# Patient Record
Sex: MALE | Race: WHITE | Employment: FULL TIME | URBAN - METROPOLITAN AREA
[De-identification: names, ages, dates, MRNs, and addresses within clinical notes are randomized per-mention and may not be internally consistent; named-entity substitution may affect disease eponyms.]

---

## 2019-05-20 ENCOUNTER — HOSPITAL ENCOUNTER (OUTPATIENT)
Dept: PHYSICAL THERAPY | Age: 49
Discharge: HOME | End: 2019-05-20
Payer: COMMERCIAL

## 2019-05-20 PROCEDURE — 97110 THERAPEUTIC EXERCISES: CPT

## 2019-05-20 PROCEDURE — 97161 PT EVAL LOW COMPLEX 20 MIN: CPT

## 2019-05-20 NOTE — PROGRESS NOTES
PHYSICAL THERAPY - DAILY TREATMENT NOTE Patient Name: Alicja Tran        Date: 2019 : 1970   yes Patient  Verified Visit #:   1   of   8  Insurance: Payor: /   
 
In time: 587 Out time: 605 Total Treatment Time: 30 Medicare/[facility] Time Tracking (below) Total Timed Codes (min):  na 1:1 Treatment Time:  na  
TREATMENT AREA =  Left shoulder pain [M25.512] SUBJECTIVE Pain Level (on 0 to 10 scale):  0  / 10 Medication Changes/New allergies or changes in medical history, any new surgeries or procedures?    no  If yes, update Summary List  
Subjective Functional Status/Changes:  []  No changes reported SEE POC OBJECTIVE 15 min Evaluation 15 min Therapeutic Exercise:  [x]  See flow sheet Rationale:      increase ROM and increase strength to improve the patients ability to perform functional ADL's Billed With/As: 
 [] TE 
 [] TA 
 [] Neuro 
 [] Self Care Patient Education: [x] Review HEP [] Progressed/Changed HEP based on:  
[] positioning   [] body mechanics   [] transfers   [] heat/ice application   
[] other:   
Other Objective/Functional Measures: SEE POC Post Treatment Pain Level (on 0 to 10) scale:   0  / 10 ASSESSMENT Assessment/Changes in Function:  
 
Evaluation Code Complexity: History LOW Complexity : Zero comorbidities / personal factors that will impact the outcome / POC; Examination HIGH Complexity : 4+ Standardized tests and measures addressing body structure, function, activity limitation and / or participation in recreation ; Presentation MEDIUM Complexity : Evolving with changing characteristics ; Decision Making MEDIUM Complexity : FOTO score of 26-74; Complexity LOW Justification for Eval Code Complexity: 
Patient History : None listed Examination SEE ABOVE EXAM 
Clinical Presentation: evolving pain Clinical Decision Making : TYGC 54 
 
  
[]  See Progress Note/Recertification Patient will continue to benefit from skilled PT services to modify and progress therapeutic interventions, address functional mobility deficits, address ROM deficits, address strength deficits, analyze and address soft tissue restrictions, analyze and cue movement patterns, analyze and modify body mechanics/ergonomics and assess and modify postural abnormalities to attain remaining goals. Progress toward goals / Updated goals: SEE POC PLAN 
[]  Upgrade activities as tolerated yes Continue plan of care  
[]  Discharge due to :   
[x]  Other: Pt will be seen 2 times per week for 8 sessions. Therapist: Elena Rosales PT, DPT Date: 5/20/2019 Time: 9:11 AM  
 
No future appointments.

## 2019-05-20 NOTE — PROGRESS NOTES
[address] PHYSICAL THERAPY 
[address] [place] [address], [address],[facility], [facility] - Phone: [phone]  Fax: [phone] PLAN OF CARE / STATEMENT OF MEDICAL NECESSITY FOR PHYSICAL THERAPY SERVICES Patient Name: Jimmy Edwards : 1970 Medical  
Diagnosis: Left shoulder pain [M25.512] Treatment Diagnosis: Left shoulder pain [M25.512] Onset Date: 2 months ago Referral Source: Erica Mendes MD [facility]): 2019 Prior Hospitalization: See medical history Provider #: [id number] Prior Level of Function: I with ADL's, I with recreational and gym activities Comorbidities: None Listed Medications: Verified on Patient Summary List  
The Plan of Care and following information is based on the information from the initial evaluation.  
================================================================================== Assessment / key information:   Jimmy Edwards is a 44 y.o.  yo male with Dx: Left shoulder pain [M25.512]. Pt reports pain began 2 months ago after working out. Pt reports currently seeing a chiropractor as well as working out with a . Pt denies any red flags, N/t, headaches, feeling of popping/grinding/clicking. Pt reports main limitations with bench press movement, as well as overhead motions. Pt reports pain on the lateral aspect of the shoulder, described as deep throbbing. Pt denies pain with sleeping. Pt rates pain as 8/10 max, 0/10 at best.  Pt reprots having similar symptoms 15 years ago and attending OPPT with good results. Objective: FOTO score = 72 (an established functional score where 100 = no disability). Posture FHRS in sitting and standing. Palpation TTP and moderate tightness noted in the anterior shoulder, BT, posterior shoulder. AROM of the L shoulder WNL in all directions with pain at end range flexion.  IR (measured in cm from C7): R 25, L 29 with pain. Strength grossly intact to 4/5, with RTC strength in 4-/5 in all directions. Special Tests (-) empty can, rodriguez raffy, drop arm, load and shift. Functional limitations at this time include decreased lifting overhead, decreased ability to perform recreational activities such as weight lifting. The patient was instructed in a home exercise program to address the above findings/deficits. The patient would benefit from skilled physical therapy at this time to address the above functional deficits.  
================================================================================== Eval Complexity: History LOW Complexity : Zero comorbidities / personal factors that will impact the outcome / POC;  Examination  HIGH Complexity : 4+ Standardized tests and measures addressing body structure, function, activity limitation and / or participation in recreation ; Presentation MEDIUM Complexity : Evolving with changing characteristics ; Decision Making MEDIUM Complexity : FOTO score of 26-74; Overall Complexity LOW Problem List: pain affecting function, decrease strength, decrease ADL/ functional abilitiies and decrease activity tolerance Treatment Plan may include any combination of the following: Therapeutic exercise, Therapeutic activities, Neuromuscular re-education, Physical agent/modality, Manual therapy, Patient education, Self Care training and Functional mobility training Patient / Family readiness to learn indicated by: asking questions, trying to perform skills and interest 
Persons(s) to be included in education: patient (P) Barriers to Learning/Limitations: None Measures taken, if barriers to learning:   
Patient Goal (s): Decrease shoulder pain Patient self reported health status: good Rehabilitation Potential: good? Short Term Goals: To be accomplished in  2  weeks: 1. Pt to demonstrate independence with HEP to improve scapular stability and shoulder strength for ADLs. 2. Pt to demonstrate improved max pain to <4/10 in order to improve tolerance with functional ADL's ? Long Term Goals: To be accomplished in  4  weeks: 1. Improve FOTO outcome score to 77/100  in order to indicate a significant improvement in ADL function. 2. Pt to report +5 or > on GROC to improve functional mobility for ADLs. 3. Pt to demonstrate 4+/5 or > R shoulder RTC strength to improve functional mobility for ADLs. 4. Patient will be independent with long term HEP in order to prepare for DC to home. Frequency / Duration:   Patient to be seen  2  times per week for 8  treatments: 
Patient / Caregiver education and instruction: exercises G-Codes (GP): MARQUISE Therapist Signature: Radha Osman PT, DPT Date: 5/20/2019 Certification Period: NA Time: 9:11 AM  
================================================================================== I certify that the above Physical Therapy Services are being furnished while the patient is under my care. I agree with the treatment plan and certify that this therapy is necessary. Physician Signature:       Date:      Time:  Please sign and return to [facility] at [facility], [facility]. or you may fax the signed copy to [phone]. Thank you.

## 2019-05-22 ENCOUNTER — HOSPITAL ENCOUNTER (OUTPATIENT)
Dept: PHYSICAL THERAPY | Age: 49
Discharge: HOME | End: 2019-05-22
Payer: COMMERCIAL

## 2019-05-22 PROCEDURE — 97110 THERAPEUTIC EXERCISES: CPT

## 2019-05-22 PROCEDURE — 97140 MANUAL THERAPY 1/> REGIONS: CPT

## 2019-05-22 NOTE — PROGRESS NOTES
PHYSICAL THERAPY - DAILY TREATMENT NOTE    Patient Name: Reid Mills        Date: 2019  : 1970   yes Patient  Verified  Visit #:      of     Insurance: Payor: [place] / Plan: [address] / Product Type: Commerical /      In time: 691 Out time:    Total Treatment Time: 25     Medicare/[employer] Time Tracking (below)   Total Timed Codes (min):  25 1:1 Treatment Time:       TREATMENT AREA =  Left shoulder pain [M25.512]    SUBJECTIVE  Pain Level (on 0 to 10 scale):  2-3  / 10   Medication Changes/New allergies or changes in medical history, any new surgeries or procedures?    no  If yes, update Summary List   Subjective Functional Status/Changes:  []  No changes reported     This started when I went back to doing bench press with my . OBJECTIVE  Modalities Rationale:     decrease inflammation and decrease pain to improve patient's ability to perform pain free ADLs. min [] Estim, type/location:                                      []  att     []  unatt     []  w/US     []  w/ice    []  w/heat    min []  Mechanical Traction: type/lbs                   []  pro   []  sup   []  int   []  cont    []  before manual    []  after manual    min []  Ultrasound, settings/location:      min []  Iontophoresis w/ dexamethasone, location:                                               []  take home patch       []  in clinic   10 min [x]  Ice     []  Heat    location/position: Supine, (L) shoulder. min []  Vasopneumatic Device, press/temp:     min []  Other:    [x] Skin assessment post-treatment (if applicable):    [x]  intact    []  redness- no adverse reaction     []redness  adverse reaction:        15 min Therapeutic Exercise:  [x]  See flow sheet   Rationale:      increase ROM, increase strength, improve coordination and improve balance to improve the patients ability to perform pain free ADLs. 10 min Manual Therapy: TPR (L) periscapular mms.     Rationale: decrease pain, increase ROM, increase tissue extensibility and decrease trigger points to improve patient's ability to perform pain free ADLs. Billed With/As:   [x] TE   [] TA   [] Neuro   [] Self Care Patient Education: [x] Review HEP    [] Progressed/Changed HEP based on:   [] positioning   [] body mechanics   [] transfers   [] heat/ice application    [] other:      Other Objective/Functional Measures: Added multiple exercises to improve shoulder strength and stability for ADLs. Updated HEP. Post Treatment Pain Level (on 0 to 10) scale:   0  / 10     ASSESSMENT  Assessment/Changes in Function:     TTP post cuff and thoracic paraspinals. []  See Progress Note/Recertification   Patient will continue to benefit from skilled PT services to modify and progress therapeutic interventions, address functional mobility deficits, address ROM deficits, address strength deficits, analyze and address soft tissue restrictions, analyze and cue movement patterns, analyze and modify body mechanics/ergonomics and assess and modify postural abnormalities to attain remaining goals. Progress toward goals / Updated goals:    Initiated therex.       PLAN  [x]  Upgrade activities as tolerated yes Continue plan of care   []  Discharge due to :    []  Other:      Therapist: Jerson Villanueva PTA    Date: 5/22/2019 Time: 12:15 PM     Future Appointments   Date Time Provider Latanya Vargas   5/24/2019  8:00 AM Genevieve North [facility]   5/29/2019 12:00 PM Genevieve North [facility]   5/31/2019  8:00 AM Janna Giordano PTA [facility]   6/5/2019 10:00 AM Janna Giordano PTA [facility]   6/7/2019  8:00 AM Genevieve North [facility]   6/12/2019 12:00 PM Andrew Shook [facility]   6/14/2019  8:00 AM Jasmine DELEON [facility]

## 2019-05-24 ENCOUNTER — HOSPITAL ENCOUNTER (OUTPATIENT)
Dept: PHYSICAL THERAPY | Age: 49
Discharge: HOME | End: 2019-05-24
Payer: COMMERCIAL

## 2019-05-24 PROCEDURE — 97140 MANUAL THERAPY 1/> REGIONS: CPT

## 2019-05-24 PROCEDURE — 97110 THERAPEUTIC EXERCISES: CPT

## 2019-05-24 NOTE — PROGRESS NOTES
PHYSICAL THERAPY - DAILY TREATMENT NOTE    Patient Name: Rowena Snow        Date: 2019  : 1970   yes Patient  Verified  Visit #:   3      12  Insurance: Payor: Fareed Villafana / Plan: [address] PT / Product Type: Commerical /      In time: 8:02 AM Out time: 8:50 AM   Total Treatment Time: 48     Medicare/[facility] Time Tracking (below)   Total Timed Codes (min):  n/a 1:1 Treatment Time:  n/a     TREATMENT AREA =  Left shoulder pain [M25.512]    SUBJECTIVE  Pain Level (on 0 to 10 scale): 2-3   10   Medication Changes/New allergies or changes in medical history, any new surgeries or procedures?    no  If yes, update Summary List   Subjective Functional Status/Changes:  []  No changes reported     Pt reports always having L shoulder pain, but notices the pain the most when he's trying to get his shirt off. OBJECTIVE    33 min Therapeutic Exercise:  [x]  See flow sheet   Rationale:      increase ROM, increase strength, improve coordination and improve balance to improve the patients ability to perform pain free ADLs. 15 min Manual Therapy: DTM to (L) post cuff and thoracic paraspinals; TPR (L) periscapular mms; manual (L) ER/IR stretch; rhythmic stabs at 90 deg (2x30\")   Rationale:      decrease pain, increase ROM, increase tissue extensibility and decrease trigger points to improve patient's ability to perform pain free ADLs. Billed With/As:   [x] TE   [] TA   [] Neuro   [] Self Care Patient Education: [x] Review HEP    [] Progressed/Changed HEP based on:   [] positioning   [] body mechanics   [] transfers   [] heat/ice application    [] other:      Other Objective/Functional Measures: Added wall push ups, KB carry, and prone I's/T's/Y's to improve scapular stability/strength     Moderate hypertonicity and muscle restriction in post cuff and rhomboids.  Decrease scapular stability in all planes noted during rhythmic stabs     Post Treatment Pain Level (on 0 to 10) scale:   3 / 10     ASSESSMENT  Assessment/Changes in Function:     No change in subjective pain following tx session. No signs of distress or red flags during therex. Demonstrated UT compensation during scapular strengthening therex; cues for mid and lower trap activation. []  See Progress Note/Recertification   Patient will continue to benefit from skilled PT services to modify and progress therapeutic interventions, address functional mobility deficits, address ROM deficits, address strength deficits, analyze and address soft tissue restrictions, analyze and cue movement patterns, analyze and modify body mechanics/ergonomics and assess and modify postural abnormalities to attain remaining goals.    Progress toward goals / Updated goals:    Steady progress towards pain reduction goal (STG #2)     PLAN  [x]  Upgrade activities as tolerated yes Continue plan of care   []  Discharge due to :    []  Other:      Therapist: KACI Garvey    Date: 5/24/2019 Time: 10:51 AM     Future Appointments   Date Time Provider Latanya Vargas   5/24/2019  8:00 AM Dora [facility]   5/29/2019 12:00 PM Dora [facility]   5/31/2019  8:00 AM Renetta Tello PTA [facility]   6/5/2019 10:00 AM Renetta Tello [facility]   6/7/2019  8:00 AM Dora [facility]   6/12/2019 12:00 PM Jennifer Casper [facility]   6/14/2019  8:00 AM Jennifer Casper [facility]

## 2019-05-29 ENCOUNTER — HOSPITAL ENCOUNTER (OUTPATIENT)
Dept: PHYSICAL THERAPY | Age: 49
Discharge: HOME | End: 2019-05-29
Payer: COMMERCIAL

## 2019-05-29 PROCEDURE — 97140 MANUAL THERAPY 1/> REGIONS: CPT

## 2019-05-29 PROCEDURE — 97110 THERAPEUTIC EXERCISES: CPT

## 2019-05-29 NOTE — PROGRESS NOTES
PHYSICAL THERAPY - DAILY TREATMENT NOTE    Patient Name: Bambi Gitelman        Date: 2019  : 1970   yes Patient  Verified  Visit #:     Insurance: Payor: Mirian Guzman / Plan: [address] PT / Product Type: Commerical /      In time: 3199 Out time: 105   Total Treatment Time: 55     Medicare/[facility] Time Tracking (below)   Total Timed Codes (min):  55 1:1 Treatment Time:       TREATMENT AREA =  Left shoulder pain [M25.512]    SUBJECTIVE  Pain Level (on 0 to 10 scale):  5  / 10   Medication Changes/New allergies or changes in medical history, any new surgeries or procedures?    no  If yes, update Summary List   Subjective Functional Status/Changes:  []  No changes reported     No new complaints. OBJECTIVE  40 min Therapeutic Exercise:  [x]  See flow sheet   Rationale:      increase ROM, increase strength and improve coordination to improve the patients ability to perform pain free ADL's. 15 min Manual Therapy: TPR (B) t/s paraspinals, (L) post cuff and RTC. Rationale:      decrease pain, increase ROM, increase tissue extensibility and decrease trigger points to improve patient's ability to perform pain free ADLs. Billed With/As:   [x] TE   [] TA   [] Neuro   [] Self Care Patient Education: [x] Review HEP    [] Progressed/Changed HEP based on:   [] positioning   [] body mechanics   [] transfers   [] heat/ice application    [] other:      Other Objective/Functional Measures: Therex per flow sheet. Post Treatment Pain Level (on 0 to 10) scale:   2  / 10     ASSESSMENT  Assessment/Changes in Function:     No exacerbation of symptoms with today's session.       []  See Progress Note/Recertification   Patient will continue to benefit from skilled PT services to modify and progress therapeutic interventions, address functional mobility deficits, address ROM deficits, address strength deficits, analyze and address soft tissue restrictions, analyze and cue movement patterns, analyze and modify body mechanics/ergonomics and assess and modify postural abnormalities to attain remaining goals. Progress toward goals / Updated goals:    Fair Progress to    [] STG    [x] LTG  4 as shown by compliance with HEP.      PLAN  [x]  Upgrade activities as tolerated yes Continue plan of care   []  Discharge due to :    []  Other:      Therapist: Sherri Francisco PTA    Date: 5/29/2019 Time: 6:11 PM     Future Appointments   Date Time Provider Latanya Vargas   5/31/2019  8:00 AM Jerry Barahona PTA [facility]   6/5/2019 10:00 AM Jerry Barahona PTA [facility]   6/7/2019  8:00 AM Tatiana [facility]   6/12/2019 12:00 PM Samantha Wilde [facility]   6/14/2019  8:00 AM Tatiana [facility]

## 2019-05-31 ENCOUNTER — HOSPITAL ENCOUNTER (OUTPATIENT)
Dept: PHYSICAL THERAPY | Age: 49
Discharge: HOME | End: 2019-05-31
Payer: COMMERCIAL

## 2019-05-31 PROCEDURE — 97110 THERAPEUTIC EXERCISES: CPT

## 2019-05-31 PROCEDURE — 97140 MANUAL THERAPY 1/> REGIONS: CPT

## 2019-05-31 NOTE — PROGRESS NOTES
PHYSICAL THERAPY - DAILY TREATMENT NOTE    Patient Name: Mandy Padron        Date: 2019  : 1970   yes Patient  Verified  Visit #:     Insurance: Payor: [employer] / Plan: [address] PT / Product Type: Commerical /      In time: 800 Out time: 855   Total Treatment Time: 55     Medicare/[facility] Time Tracking (below)   Total Timed Codes (min):   1:1 Treatment Time:       TREATMENT AREA =  Left shoulder pain [M25.512]    SUBJECTIVE  Pain Level (on 0 to 10 scale):  2   10   Medication Changes/New allergies or changes in medical history, any new surgeries or procedures?    no  If yes, update Summary List   Subjective Functional Status/Changes:  []  No changes reported     Haven't really noticed a difference, still hurts and the pain hasn't changed. OBJECTIVE    40 min Therapeutic Exercise:  [x]  See flow sheet   Rationale:      increase ROM and improve coordination to improve the patients ability to perform pain free ADL's. 15 min Manual Therapy: TPR (B) t/s paraspinals, (L) post cuff and RTC. Rationale:      decrease pain, increase ROM and increase tissue extensibility to improve patient's ability to perform pain free ADL's. Billed With/As:   [x] TE   [] TA   [] Neuro   [] Self Care Patient Education: [x] Review HEP    [] Progressed/Changed HEP based on:   [] positioning   [] body mechanics   [] transfers   [] heat/ice application    [] other:      Other Objective/Functional Measures:    Requires vcing and tactile cueing to set shoulder in position while performing ER/IR and ABD. Post Treatment Pain Level (on 0 to 10) scale:   1  / 10     ASSESSMENT  Assessment/Changes in Function:     Reports continued pain with general ADLs and working activities.      []  See Progress Note/Recertification   Patient will continue to benefit from skilled PT services to modify and progress therapeutic interventions, address ROM deficits, address strength deficits, analyze and address soft tissue restrictions and analyze and cue movement patterns to attain remaining goals. Progress toward goals / Updated goals: · Short Term Goals: To be accomplished in  2  weeks:  1. Pt to demonstrate independence with HEP to improve scapular stability and shoulder strength for ADLs. 2. Pt to demonstrate improved max pain to <4/10 in order to improve tolerance with functional ADL's     · Long Term Goals: To be accomplished in  4  weeks:  1. Improve FOTO outcome score to 77/100  in order to indicate a significant improvement in ADL function. 2. Pt to report +5 or > on GROC to improve functional mobility for ADLs. 3. Pt to demonstrate 4+/5 or > R shoulder RTC strength to improve functional mobility for ADLs. 4. Patient will be independent with long term HEP in order to prepare for DC to home.          PLAN  []  Upgrade activities as tolerated yes Continue plan of care   []  Discharge due to :    []  Other:      Therapist: Ivon Zambrano PTA    Date: 5/31/2019 Time: 5:56 AM     Future Appointments   Date Time Provider Latanya Vargas   5/31/2019  8:00 AM Cali Cifuentes PTA [facility]   6/5/2019 10:00 AM Cali Cifuentes PTA [facility]   6/7/2019  8:00 AM Theodore [facility]   6/12/2019 12:00 PM Theodore [facility]   6/14/2019  8:00 AM Major Jurado [facility]

## 2019-06-05 ENCOUNTER — APPOINTMENT (OUTPATIENT)
Dept: PHYSICAL THERAPY | Age: 49
End: 2019-06-05
Payer: COMMERCIAL

## 2019-06-06 ENCOUNTER — HOSPITAL ENCOUNTER (OUTPATIENT)
Dept: PHYSICAL THERAPY | Age: 49
Discharge: HOME | End: 2019-06-06
Payer: COMMERCIAL

## 2019-06-06 PROCEDURE — 97110 THERAPEUTIC EXERCISES: CPT

## 2019-06-06 NOTE — PROGRESS NOTES
PHYSICAL THERAPY - DAILY TR EATMENT NOTE    Patient Name: Gallito Delatorre        Date: 2019  : 1970   yes Patient  Verified  Visit #:     Insurance: Payor: Burton King / Plan: [address] / Product Type: Commerical /      In time: 2:03 Out time: 2:30   Total Treatment Time: 27     Medicare/[facility] Time Tracking (below)   Total Timed Codes (min):   1:1 Treatment Time:       TREATMENT AREA =  Left shoulder pain [M25.512]    SUBJECTIVE  Pain Level (on 0 to 10 scale):  3-4  / 10   Medication Changes/New allergies or changes in medical history, any new surgeries or procedures?    no  If yes, update Summary List   Subjective Functional Status/Changes:  []  No changes reported   I have not noticed any change yet. I need to leave by 2:30. OBJECTIVE    27 min Therapeutic Exercise:  [x]  See flow sheet   Rationale:      increase ROM and improve coordination to improve the patients ability to perform pain free ADL's. TC min Manual Therapy: TPR (B) t/s paraspinals, (L) post cuff and RTC. Rationale:      decrease pain, increase ROM and increase tissue extensibility to improve patient's ability to perform pain free ADL's. Billed With/As:   [x] TE   [] TA   [] Neuro   [] Self Care Patient Education: [x] Review HEP    [] Progressed/Changed HEP based on:   [] positioning   [] body mechanics   [] transfers   [] heat/ice application    [] other:      Other Objective/Functional Measures:   VCs for form with wall slides with FR  Demo and cuing for form with prone letters     Post Treatment Pain Level (on 0 to 10) scale:   0  / 10 \"I took a pain killer. \"     ASSESSMENT  Assessment/Changes in Function:   Patient demonstrates fair tolerance to TE requiring demo and cuing for proper form. Limited session today and patient reports he needs to leave by 2:30 and he declines CP.  Unable to ascertain whether TE caused pain as patient reports he took a pain killer before coming and it has kicked in.      []  See Progress Note/Recertification   Patient will continue to benefit from skilled PT services to modify and progress therapeutic interventions, address ROM deficits, address strength deficits, analyze and address soft tissue restrictions and analyze and cue movement patterns to attain remaining goals. Progress toward goals / Updated goals: · Short Term Goals: To be accomplished in  2  weeks:  1. Pt to demonstrate independence with HEP to improve scapular stability and shoulder strength for ADLs. Progressing (6/6/19)  2. Pt to demonstrate improved max pain to <4/10 in order to improve tolerance with functional ADL's. Progressing, 3-4/10 (6/6/19)     · Long Term Goals: To be accomplished in  4  weeks:  1. Improve FOTO outcome score to 77/100  in order to indicate a significant improvement in ADL function. 2. Pt to report +5 or > on GROC to improve functional mobility for ADLs. 3. Pt to demonstrate 4+/5 or > R shoulder RTC strength to improve functional mobility for ADLs. 4. Patient will be independent with long term HEP in order to prepare for DC to home.          PLAN  []  Upgrade activities as tolerated yes Continue plan of care   []  Discharge due to :    []  Other:      Therapist: KACI Ley    Date: 6/6/2019 Time: 2:30 PM     Future Appointments   Date Time Provider Latanya Vargas   6/6/2019  2:00 PM [facility]   6/10/2019  3:00 PM Krystian Cohn [facility]   6/12/2019  3:00 PM Krystian Cohn [facility]   6/17/2019  3:00 PM Krystian Cohn [facility]   6/19/2019  3:00 PM Krystian Cohn [facility]   6/24/2019  3:00 PM Krystian Cohn [facility]   6/26/2019  3:00 PM Krystian Cohn [facility]

## 2019-06-07 ENCOUNTER — APPOINTMENT (OUTPATIENT)
Dept: PHYSICAL THERAPY | Age: 49
End: 2019-06-07
Payer: COMMERCIAL

## 2019-06-10 ENCOUNTER — HOSPITAL ENCOUNTER (OUTPATIENT)
Dept: PHYSICAL THERAPY | Age: 49
Discharge: HOME | End: 2019-06-10
Payer: COMMERCIAL

## 2019-06-10 PROCEDURE — 97110 THERAPEUTIC EXERCISES: CPT

## 2019-06-10 PROCEDURE — 97140 MANUAL THERAPY 1/> REGIONS: CPT

## 2019-06-10 NOTE — PROGRESS NOTES
PT DAILY TREATMENT NOTE     Patient Name: Miriam Chaves  Date:6/10/2019  : 1970  [x]  Patient  Verified  Payor: Karan Saenz / Plan: [employer] PT / Product Type: Commerical /    In time: 3:07 pm      Out time: 3:54 pm  Total Treatment Time (min): 47  Visit #: 7 of     Treatment Area: Left shoulder pain [M25.512]    SUBJECTIVE  Pain Level (0-10 scale): 3-4  Any medication changes, allergies to medications, adverse drug reactions, diagnosis change, or new procedure performed?: [x] No    [] Yes (see summary sheet for update)  Subjective functional status/changes:   [] No changes reported  \"I have trouble reaching behind my back. It hurts. .\"    OBJECTIVE  Modality rationale: PD     Min Type Additional Details    [] Estim: []Att   []Unatt                  []IFC  []Premod                                            []NMES    []Other:  []w/ice   []w/heat  Position:  Location:    []  Traction: [] Cervical       [] Lumbar                       [] Supine        [] Prone                       []Intermittent   []Continuous Lbs:  [] before manual  [] after manual    []  Ultrasound: []Continuous   [] Pulsed                           []1MHz   []3MHz Location:  W/cm2:    []  Iontophoresis with dexamethasone         Location: [] Take home patch   [] In clinic    []  Ice     []  heat  []  Ice massage Position:  Location:    []  Vasopneumatic Device Pressure: [] lo [] med [] hi   Temp: [] lo [] med [] hi   [x] Skin assessment post-treatment:  [x]intact    []redness- no adverse reaction                                                                                 []redness - adverse reaction:     35 min Therapeutic Exercise:  [x] See flow sheet: progressed shoulder IR/ER concentrics to walkaways   Rationale: increase ROM, increase strength, improve coordination and increase proprioception to improve the patients ability to reach, lift, perform work duties     12 min Manual Therapy: supine TPR to (L) subscap and teres minor, GHJ grade III distractions f/b PROM IR/ER   Rationale: decrease pain, increase ROM, increase tissue extensibility and decrease trigger points to improve the patients ability to reach, complete ADLs         with TE min Patient Education: [x] Review HEP     Other Objective/Functional Measures:   (L) shoulder flexion AROM: 166 deg (VC's to avoid lumbar extension)    Pain Level (0-10 scale) post treatment: 1    ASSESSMENT/Changes in Function:   Patient with TrPs in the subscap interfering with normalized shoulder IR AROM. Flexion AROM WFL, however, pt demos poor scapular protraction with elevation attempts indicative of normal SHR. Good tolerance to strength progression. Patient will continue to benefit from skilled PT services to modify and progress therapeutic interventions, address ROM deficits, address strength deficits, analyze and address soft tissue restrictions and analyze and cue movement patterns to attain remaining goals. []  See Plan of Care  []  See progress note/recertification  []  See Discharge Summary         Progress towards goals / Updated goals:         Short Term Goals: To be accomplished in  2  weeks:  1. Pt to demonstrate independence with HEP to improve scapular stability and shoulder strength for ADLs. Progressing (6/6/19)  2.  Pt to demonstrate improved max pain to <4/10 in order to improve tolerance with functional ADL's. Progressing, 3-4/10 (6/6/19)           Long Term Goals: To be accomplished in  4  weeks:  1. Improve FOTO outcome score to 77/100  in order to indicate a significant improvement in ADL function. 2.  Pt to report +5 or > on GROC to improve functional mobility for ADLs. 3.  Pt to demonstrate 4+/5 or > R shoulder RTC strength to improve functional mobility for ADLs. 4.  Patient will be independent with long term HEP in order to prepare for DC to home.     PLAN  [x]  Upgrade activities as tolerated     [x]  Continue plan of care  []  Update interventions per flow sheet       []  Discharge due to:_  [x]  Other: assess response to treatment; PN due next week     Karo Patrick, PTA 6/10/2019 [facility]

## 2019-06-12 ENCOUNTER — APPOINTMENT (OUTPATIENT)
Dept: PHYSICAL THERAPY | Age: 49
End: 2019-06-12
Payer: COMMERCIAL

## 2019-06-12 ENCOUNTER — HOSPITAL ENCOUNTER (OUTPATIENT)
Dept: PHYSICAL THERAPY | Age: 49
Discharge: HOME | End: 2019-06-12
Payer: COMMERCIAL

## 2019-06-12 PROCEDURE — 97140 MANUAL THERAPY 1/> REGIONS: CPT

## 2019-06-12 PROCEDURE — 97110 THERAPEUTIC EXERCISES: CPT

## 2019-06-12 NOTE — PROGRESS NOTES
PT DAILY TREATMENT NOTE     Patient Name: Jovanny Clark  Date:2019  : 1970  [x]  Patient  Verified  Payor: Jaydon Santos / [place]: [employer]  [employer]IT[employer] PT / Product Type: Commerical /    In time: 3:05 pm      Out time: 3:54 pm  Total Treatment Time (min): 49  Visit #: 8 of     Treatment Area: Left shoulder pain [M25.512]    SUBJECTIVE  Pain Level (0-10 scale): 3-4  Any medication changes, allergies to medications, adverse drug reactions, diagnosis change, or new procedure performed?: [x] No    [] Yes (see summary sheet for update)  Subjective functional status/changes:   [] No changes reported  \"Doing okay. \"    OBJECTIVE  Modality rationale: PD     Min Type Additional Details    [] Estim: []Att   []Unatt                  []IFC  []Premod                                            []NMES    []Other:  []w/ice   []w/heat  Position:  Location:    []  Traction: [] Cervical       [] Lumbar                       [] Supine        [] Prone                       []Intermittent   []Continuous Lbs:  [] before manual  [] after manual    []  Ultrasound: []Continuous   [] Pulsed                           []1MHz   []3MHz Location:  W/cm2:    []  Iontophoresis with dexamethasone         Location: [] Take home patch   [] In clinic    []  Ice     []  heat  []  Ice massage Position:  Location:    []  Vasopneumatic Device Pressure: [] lo [] med [] hi   Temp: [] lo [] med [] hi   [x] Skin assessment post-treatment:  [x]intact    []redness- no adverse reaction                                                                                 []redness - adverse reaction:     29 min Therapeutic Exercise:  [x] See flow sheet: added SA step ups off 4 inch step, QP UE raises for scapular stabs   Rationale: increase ROM, increase strength, improve coordination and increase proprioception to improve the patients ability to reach, lift, perform work duties     10 min Manual Therapy: supine TPR to (L) subscap and teres minor, GHJ grade III distractions f/b PROM IR/ER   Rationale: decrease pain, increase ROM, increase tissue extensibility and decrease trigger points to improve the patients ability to reach, complete ADLs         with TE min Patient Education: [x] Review HEP     Other Objective/Functional Measures:   (L) shoulder AROM: FIR to level of ipsilateral T7 (symmetrical to right UE)    Pain Level (0-10 scale) post treatment: 0    ASSESSMENT/Changes in Function:   Cont's with TrP in the (L) subscap released well with manual interventions. Improved PA spring with T/S mobs and pt demos symmetrical FIR. Patient will continue to benefit from skilled PT services to modify and progress therapeutic interventions, address ROM deficits, address strength deficits, analyze and address soft tissue restrictions and analyze and cue movement patterns to attain remaining goals. []  See Plan of Care  []  See progress note/recertification  []  See Discharge Summary         Progress towards goals / Updated goals:         Short Term Goals: To be accomplished in  2  weeks:  1. Pt to demonstrate independence with HEP to improve scapular stability and shoulder strength for ADLs. Progressing (6/6/19)  2.  Pt to demonstrate improved max pain to <4/10 in order to improve tolerance with functional ADL's. Progressing, 3-4/10 (6/6/19)           Long Term Goals: To be accomplished in  4  weeks:  1. Improve FOTO outcome score to 77/100  in order to indicate a significant improvement in ADL function. 2.  Pt to report +5 or > on GROC to improve functional mobility for ADLs. 3.  Pt to demonstrate 4+/5 or > R shoulder RTC strength to improve functional mobility for ADLs. 4.  Patient will be independent with long term HEP in order to prepare for DC to home.     PLAN  [x]  Upgrade activities as tolerated     [x]  Continue plan of care  []  Update interventions per flow sheet       []  Discharge due to:_  [x]  Other: work on scapular retraction with Lesli Chambers, PTA 6/12/2019

## 2019-06-14 ENCOUNTER — APPOINTMENT (OUTPATIENT)
Dept: PHYSICAL THERAPY | Age: 49
End: 2019-06-14
Payer: COMMERCIAL

## 2019-06-17 ENCOUNTER — HOSPITAL ENCOUNTER (OUTPATIENT)
Dept: PHYSICAL THERAPY | Age: 49
Discharge: HOME | End: 2019-06-17
Payer: COMMERCIAL

## 2019-06-17 PROCEDURE — 97140 MANUAL THERAPY 1/> REGIONS: CPT

## 2019-06-17 PROCEDURE — 97110 THERAPEUTIC EXERCISES: CPT

## 2019-06-19 ENCOUNTER — HOSPITAL ENCOUNTER (OUTPATIENT)
Dept: PHYSICAL THERAPY | Age: 49
Discharge: HOME | End: 2019-06-19
Payer: COMMERCIAL

## 2019-06-19 PROCEDURE — 97035 APP MDLTY 1+ULTRASOUND EA 15: CPT

## 2019-06-19 PROCEDURE — 97110 THERAPEUTIC EXERCISES: CPT

## 2019-06-19 PROCEDURE — 97140 MANUAL THERAPY 1/> REGIONS: CPT

## 2019-06-19 NOTE — PROGRESS NOTES
PT DAILY TREATMENT NOTE     Patient Name: Rowena Snow  Date:2019  : 1970  [x]  Patient  Verified  Payor: Fareed Villafana / [place]: [employer]  [employer]IT[employer] PT / Product Type: Commerical /    In time: 3:07 pm      Out time: 4:03 pm  Total Treatment Time (min): 56  Visit #: 10 of     Treatment Area: Left shoulder pain [M25.512]    SUBJECTIVE  Pain Level (0-10 scale): 1-2  Any medication changes, allergies to medications, adverse drug reactions, diagnosis change, or new procedure performed?: [x] No    [] Yes (see summary sheet for update)  Subjective functional status/changes:   [] No changes reported  \"I always have that dull pain. \"    OBJECTIVE  Modality rationale: PD     Min Type Additional Details    [] Estim: []Att   []Unatt                  []IFC  []Premod                                            []NMES    []Other:  []w/ice   []w/heat  Position:  Location:    []  Traction: [] Cervical       [] Lumbar                       [] Supine        [] Prone                       []Intermittent   []Continuous Lbs:  [] before manual  [] after manual   8 [x]  Ultrasound: []Continuous   [x] Pulsed 50%                           [x]1MHz   []3MHz Location: (L) lateral shoulder  W/cm2: 1.2    []  Iontophoresis with dexamethasone         Location: [] Take home patch   [] In clinic    []  Ice     []  heat  []  Ice massage Position:  Location:    []  Vasopneumatic Device Pressure: [] lo [] med [] hi   Temp: [] lo [] med [] hi   [x] Skin assessment post-treatment:  [x]intact    []redness- no adverse reaction                                                                                 []redness - adverse reaction:     32 min Therapeutic Exercise:  [x] See flow sheet: PTA tech assisted pt with FOTO completion   Rationale: increase ROM, increase strength, improve coordination and increase proprioception to improve the patients ability to reach, lift, perform work duties     16 min Manual Therapy: supine TPR to (L) subscap and teres minor, GHJ grade III distractions f/b PROM IR/ER, reassessment   Rationale: decrease pain, increase ROM, increase tissue extensibility and decrease trigger points to improve the patients ability to reach, complete ADLs         with TE min Patient Education: [x] Review HEP for DC     Other Objective/Functional Measures:   Improvements: some improvement with workouts, lifting  Deficits: resting pain, full FIR  FOTO score: 73/100 (at last assessment, 72/100)  (L) shoulder AROM: WFL/WNL in all directions, full PEPITO at 90/90 deg ABD, IR/ADD 25 cm from C7  (L) shoulder strength: grossly 5/5 except 4+/5 IR  (L) scap strength: SA 4/5, MT 4+/5, LT 3-/5  Pain: (B) 1, (W) 5    Pain Level (0-10 scale) post treatment: 1-2    ASSESSMENT/Changes in Function:   See D/C    Patient will continue to benefit from skilled PT services to modify and progress therapeutic interventions, address ROM deficits, address strength deficits, analyze and address soft tissue restrictions and analyze and cue movement patterns to attain remaining goals. [x]  See Discharge Summary         Progress towards goals / Updated goals:  1. Improve FOTO outcome score to 77/100  in order to indicate a significant improvement in ADL function. -Goal not met; 73/100  2. Pt to report +5 or > on GROC to improve functional mobility for ADLs. -Goal not met; GROC +2  3. Pt to demonstrate 4+/5 or > R shoulder RTC strength to improve functional mobility for ADLs. -Goal met; pt demos 4+/5 to 5/5 RTC strength  4. Patient will be independent with long term HEP in order to prepare for DC to home.  -Goal met; reports understanding    PLAN  [x]  Discharge due to: minimal progress towards goals    Sandra Lat, PTA 6/19/2019

## 2019-06-24 ENCOUNTER — HOSPITAL ENCOUNTER (OUTPATIENT)
Dept: PHYSICAL THERAPY | Age: 49
End: 2019-06-24
Payer: COMMERCIAL

## 2019-06-26 ENCOUNTER — APPOINTMENT (OUTPATIENT)
Dept: PHYSICAL THERAPY | Age: 49
End: 2019-06-26
Payer: COMMERCIAL

## 2019-08-06 NOTE — PROGRESS NOTES
[facility] PHYSICAL THERAPY AT [address], [place], [place]  Phone: [phone] Fax [phone] SUMMARY  Patient Name: Gallito Delatorre : 1970   Treatment/Medical Diagnosis: Left shoulder pain [M25.512]   Referral Source: Jerrod Avina MD     Date of Initial Visit: 19 Attended Visits: 10 Missed Visits: 0     SUMMARY OF TREATMENT  Patient was being treated for L shoulder. Treatment included progressive therex for ROM, flexibility, strength , manual, KT taping and CP.  ,   CURRENT STATUS  Patient made good progress with PT. At last attended session, patient reports dull ache that he has chronically dealt with . Patient has all the tools and knowledge needed to continue progress via HEP at this time. Assessment as follows:  Improvements: some improvement with workouts, lifting  Deficits: resting pain, full FIR  FOTO score: 73/100 (at last assessment, 72/100)  (L) shoulder AROM: WFL/WNL in all directions, full PEPITO at 90/90 deg ABD, IR/ADD 25 cm from C7  (L) shoulder strength: grossly 5/5 except 4+/5 IR  (L) scap strength: SA 4/5, MT 4+/5, LT 3-/5  Pain: (B) 1, (W) 5         Progress towards goals / Updated goals:  1. Improve FOTO outcome score to 77/100  in order to indicate a significant improvement in ADL function. -Goal not met; 73/100  2. Pt to report +5 or > on GROC to improve functional mobility for ADLs. -Goal not met; GROC +2  3. Pt to demonstrate 4+/5 or > R shoulder RTC strength to improve functional mobility for ADLs. -Goal met; pt demos 4+/5 to 5/5 RTC strength  4. Patient will be independent with long term HEP in order to prepare for DC to home. -Goal met; reports understanding  RECOMMENDATIONS  Discontinue therapy. Progressing towards or have reached established goals. If you have any questions/comments please contact us directly at [phone].    Thank you for allowing us to assist in the care of your patient.   Therapist Signature: Panda Teran, PT Date: 8/6/19   Reporting Period: NA Time: 1210pm